# Patient Record
Sex: FEMALE | Race: WHITE
[De-identification: names, ages, dates, MRNs, and addresses within clinical notes are randomized per-mention and may not be internally consistent; named-entity substitution may affect disease eponyms.]

---

## 2017-05-17 ENCOUNTER — HOSPITAL ENCOUNTER (EMERGENCY)
Dept: HOSPITAL 84 - D.ER | Age: 57
Discharge: HOME | End: 2017-05-17
Payer: MEDICARE

## 2017-05-17 DIAGNOSIS — M25.571: Primary | ICD-10-CM

## 2017-12-13 ENCOUNTER — HOSPITAL ENCOUNTER (OUTPATIENT)
Dept: HOSPITAL 84 - D.RAD | Age: 57
Discharge: HOME | End: 2017-12-13
Attending: FAMILY MEDICINE
Payer: MEDICARE

## 2017-12-13 DIAGNOSIS — R06.00: Primary | ICD-10-CM

## 2018-06-08 ENCOUNTER — HOSPITAL ENCOUNTER (OUTPATIENT)
Dept: HOSPITAL 84 - D.MAMMO | Age: 58
Discharge: HOME | End: 2018-06-08
Attending: FAMILY MEDICINE
Payer: MEDICARE

## 2018-06-08 DIAGNOSIS — N63.14: Primary | ICD-10-CM

## 2019-02-01 ENCOUNTER — HOSPITAL ENCOUNTER (OUTPATIENT)
Dept: HOSPITAL 84 - D.RAD | Age: 59
Discharge: HOME | End: 2019-02-01
Attending: FAMILY MEDICINE
Payer: MEDICARE

## 2019-02-01 VITALS — BODY MASS INDEX: 37.7 KG/M2

## 2019-02-01 DIAGNOSIS — M25.561: Primary | ICD-10-CM

## 2019-02-14 ENCOUNTER — HOSPITAL ENCOUNTER (OUTPATIENT)
Dept: HOSPITAL 84 - D.MRI | Age: 59
Discharge: HOME | End: 2019-02-14
Attending: FAMILY MEDICINE
Payer: COMMERCIAL

## 2019-02-14 VITALS — BODY MASS INDEX: 37.7 KG/M2

## 2019-02-14 DIAGNOSIS — M23.91: Primary | ICD-10-CM

## 2019-02-27 LAB
ERYTHROCYTE [DISTWIDTH] IN BLOOD BY AUTOMATED COUNT: 13.8 % (ref 11.5–14.5)
HCT VFR BLD CALC: 42.7 % (ref 36–48)
HGB BLD-MCNC: 14.7 G/DL (ref 12–16)
MCH RBC QN AUTO: 30.4 PG (ref 26–34)
MCHC RBC AUTO-ENTMCNC: 34.4 G/DL (ref 31–37)
MCV RBC: 88.2 FL (ref 80–100)
PLATELET # BLD: 350 10X3/UL (ref 130–400)
PMV BLD AUTO: 10.1 FL (ref 7.4–10.4)
RBC # BLD AUTO: 4.84 10X6/UL (ref 4–5.4)
WBC # BLD AUTO: 8.3 10X3/UL (ref 4.8–10.8)

## 2019-02-28 ENCOUNTER — HOSPITAL ENCOUNTER (OUTPATIENT)
Dept: HOSPITAL 84 - D.OPS | Age: 59
Discharge: HOME | End: 2019-02-28
Attending: ORTHOPAEDIC SURGERY
Payer: MEDICARE

## 2019-02-28 VITALS
WEIGHT: 240 LBS | HEIGHT: 67 IN | WEIGHT: 240 LBS | HEIGHT: 67 IN | BODY MASS INDEX: 37.67 KG/M2 | BODY MASS INDEX: 37.67 KG/M2

## 2019-02-28 VITALS — DIASTOLIC BLOOD PRESSURE: 63 MMHG | SYSTOLIC BLOOD PRESSURE: 109 MMHG

## 2019-02-28 DIAGNOSIS — M94.261: ICD-10-CM

## 2019-02-28 DIAGNOSIS — Z01.812: ICD-10-CM

## 2019-02-28 DIAGNOSIS — S83.271A: Primary | ICD-10-CM

## 2019-02-28 NOTE — NUR
1435-RECD FROM PACU. ALERT. IV PATENT. R KNEE DRESSING DRY ANDINTACT.
 ICE PACK IN PLACE.
1505-NO NAUSEA AFTER FULL LIQUIDS.
1535-UP TO BATHROOM, VOIDS
1600-IV D/C AND DRESSES
1615-DISCHARGE INSTRUCTIONS REVIEWED
1620-D/C HOME VIA WHEELCHAIR WITH FAMILY.

## 2019-03-04 NOTE — OP
PATIENT NAME:  YAZAN KATHLEEN                          MEDICAL RECORD: [id number]
:60                                             LOCATION:D.OPS          
                                                         ADMISSION DATE:        
SURGEON:  AURA MAC MD        
 
 
DATE OF OPERATION:  2019
 
PREOPERATIVE DIAGNOSIS:  Lateral meniscus tear of the right knee.
 
POSTOPERATIVE DIAGNOSES:  Lateral meniscus tear of the right knee plus large
chondral defect with multiple chondral fragments on medial femoral condyle.
 
PROCEDURES:
1.  Arthroscopic partial lateral meniscectomy.
2.  Arthroscopic chondroplasty with removal of multiple chondral fragments.
 
SURGEON:  Aura Mac MD
 
ANESTHESIA:  General.
 
INTRAOPERATIVE COMPLICATIONS:  None.
 
SUMMARY OF PATHOLOGIC FINDINGS:  The patient essentially had a large completely
detached fragmented chondral flap that was essentially the entire weightbearing
surface of the medial femoral condyle.  She also had a complex tear of the
posterior horn of the lateral meniscus.
 
OPERATIVE SUMMARY IN DETAIL:  After obtaining the appropriate preoperative
orthopedic surgery consent as well as anesthetic consultation, evaluation and
clearance, the patient was brought to the operating room and placed on the
operating table in supine position.  .  After general laryngeal mask airway was
administered, tourniquet was placed on the proximal aspect of the right lower
extremity.  The extremity was prepped and draped in routine sterile fashion. 
The leg was elevated and exsanguinated, tourniquet was inflated to 350 mmHg. 
Routine inferolateral portal was established, followed by superomedial portal
and inferomedial portal.  Diagnostic arthroscopy was then performed showing the
above findings.  Arthroscopic resector was utilized to gently remove all
floating loose chondral bodies and loose chondral flaps.  Multiple photographs
were taken of the patient's medial femoral condyle.  Having completed the
takedown and chondroplasty of the medial femoral condyle, the lateral
compartment was explored.  The patient did have a complex tear of the posterior
horn of the lateral meniscus that was debrided gently and nicely.  There were no
untoward chondral fissuring in the lateral compartment or patellofemoral
compartment.  Having completed this, the knee was insufflated with 80 mg of
Depo-Medrol and 30 cc of 0.25% Marcaine plain.  Arthroscopy portals were closed
in routine interrupted fashion using 4-0 Prolene.  Sterile dressings were
applied.  Tourniquet was deflated.  The patient was awakened and taken to the
recovery room in stable condition.  All final needle and sponge counts were
correct.
 
TRANSINT:[id number] Voice Confirmation ID: [id number] DOCUMENT ID: [id number]
 
 
 
OPERATIVE REPORT                               [id number]    YAZAN KATHLEEN MD, AURA REYES        
 
 
 
Electronically Signed by AURA MAC MD on 19 at 1004
 
 
 
 
 
 
 
 
 
 
 
 
 
 
 
 
 
 
 
 
 
 
 
 
 
 
 
 
 
 
 
 
 
 
 
 
 
 
 
 
 
CC:                                                             [id number]
DICTATION DATE: 19 1352     :     19 1530      [facility] 
                                                                      [address]
[place] [id number] [place] [id number]

## 2020-04-15 ENCOUNTER — HOSPITAL ENCOUNTER (OUTPATIENT)
Dept: HOSPITAL 84 - D.MRI | Age: 60
Discharge: HOME | End: 2020-04-15
Attending: CLINICAL NURSE SPECIALIST
Payer: MEDICARE

## 2020-04-15 VITALS — BODY MASS INDEX: 37.7 KG/M2

## 2020-04-15 DIAGNOSIS — M25.561: Primary | ICD-10-CM

## 2020-06-17 ENCOUNTER — HOSPITAL ENCOUNTER (OUTPATIENT)
Dept: HOSPITAL 84 - D.MRI | Age: 60
Discharge: HOME | End: 2020-06-17
Attending: ORTHOPAEDIC SURGERY
Payer: MEDICARE

## 2020-06-17 VITALS — BODY MASS INDEX: 37.7 KG/M2

## 2020-06-17 DIAGNOSIS — M25.562: Primary | ICD-10-CM

## 2020-07-07 LAB
ANION GAP SERPL CALC-SCNC: 8.5 MMOL/L (ref 8–16)
BUN SERPL-MCNC: 21 MG/DL (ref 7–18)
CALCIUM SERPL-MCNC: 9.6 MG/DL (ref 8.5–10.1)
CHLORIDE SERPL-SCNC: 102 MMOL/L (ref 98–107)
CO2 SERPL-SCNC: 31.6 MMOL/L (ref 21–32)
CREAT SERPL-MCNC: 1.3 MG/DL (ref 0.6–1.3)
ERYTHROCYTE [DISTWIDTH] IN BLOOD BY AUTOMATED COUNT: 13.7 % (ref 11.5–14.5)
GLUCOSE SERPL-MCNC: 114 MG/DL (ref 74–106)
HCT VFR BLD CALC: 43.4 % (ref 36–48)
HGB BLD-MCNC: 13.9 G/DL (ref 12–16)
MCH RBC QN AUTO: 29 PG (ref 26–34)
MCHC RBC AUTO-ENTMCNC: 32 G/DL (ref 31–37)
MCV RBC: 90.6 FL (ref 80–100)
OSMOLALITY SERPL CALC.SUM OF ELEC: 279 MOSM/KG (ref 275–300)
PLATELET # BLD: 323 10X3/UL (ref 130–400)
PMV BLD AUTO: 9.7 FL (ref 7.4–10.4)
POTASSIUM SERPL-SCNC: 4.1 MMOL/L (ref 3.5–5.1)
RBC # BLD AUTO: 4.79 10X6/UL (ref 4–5.4)
SODIUM SERPL-SCNC: 138 MMOL/L (ref 136–145)
WBC # BLD AUTO: 9.2 10X3/UL (ref 4.8–10.8)

## 2020-07-09 ENCOUNTER — HOSPITAL ENCOUNTER (OUTPATIENT)
Dept: HOSPITAL 84 - D.OPS | Age: 60
Discharge: HOME | End: 2020-07-09
Attending: ORTHOPAEDIC SURGERY
Payer: MEDICARE

## 2020-07-09 VITALS
WEIGHT: 240 LBS | BODY MASS INDEX: 37.67 KG/M2 | WEIGHT: 240 LBS | HEIGHT: 67 IN | BODY MASS INDEX: 37.67 KG/M2 | HEIGHT: 67 IN

## 2020-07-09 VITALS — SYSTOLIC BLOOD PRESSURE: 128 MMHG | DIASTOLIC BLOOD PRESSURE: 86 MMHG

## 2020-07-09 DIAGNOSIS — J45.909: ICD-10-CM

## 2020-07-09 DIAGNOSIS — S83.282A: Primary | ICD-10-CM
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
## 2020-07-09 DIAGNOSIS — M25.512: ICD-10-CM

## 2020-07-14 NOTE — OP
PATIENT NAME:  YAZAN KATHLEEN                          MEDICAL RECORD: [id number]
:60                                             LOCATION:D.OPS          
                                                         ADMISSION DATE:        
SURGEON:  AURA MAC MD        
 
 
DATE OF OPERATION:  2020
 
PREOPERATIVE DIAGNOSES:
1.  Medial meniscus tear of the left knee.
2.  Lateral meniscus tear of the left knee.
 
POSTOPERATIVE DIAGNOSES:
1.  Medial meniscus tear of the left knee.
2.  Lateral meniscus tear of the left knee.
 
PROCEDURES:
1.  Arthroscopic partial medial meniscectomy of the left knee.
2.  Arthroscopic partial lateral meniscectomy of the left knee.
 
SURGEON:  Aura Mac MD
 
ANESTHESIA:  General.
 
INTRAOPERATIVE COMPLICATIONS:  None.
 
SUMMARY OF PATHOLOGIC FINDINGS:  The patient had a complex tear of the posterior
horn of the medial meniscus as well as a radial beak-type tear of the lateral
meniscus.
 
OPERATIVE SUMMARY IN DETAIL:  After obtaining the appropriate preoperative
orthopedic surgery consent as well as anesthetic consultation, evaluation, and
clearance, the patient was brought to the operating room and placed on the
operating table in the supine position.  After adequate general laryngeal mask
airway was administered, a tourniquet was placed about the proximal aspect of
the left lower extremity.  The left lower extremity was then prepped and draped
in a routine sterile fashion.  At this point, the appropriate time-out was taken
and agreed upon by all given the patient's unique identifiers.  The left lower
extremity was then elevated, exsanguinated and the tourniquet was inflated to
350 mmHg.  Routine inferolateral portal was established followed by superomedial
portal and inferomedial portal.  Diagnostic arthroscopy did reveal the above
findings.  A complex tear of the posterior horn of the medial meniscus was taken
down with a combination of both meniscotomes as well as an arthroscopic resector
until the patient had stable meniscal elements without any further tearing
noted.  Having completed this, the knee was placed in a figure-of-four and the
large radial beak tear was taken out of the mid substance of the lateral
meniscus with only the inner portion having to be removed.  Only mild
chondromalacia was seen in the medial compartment.  None in the lateral
patellofemoral compartment.  Having completed this, the knee was insufflated
with 0.25% Marcaine plain 30 mL and 40 mg of Depo-Medrol.  Arthroscopy portals
were closed in a routine interrupted fashion using 4-0 Prolene.  Sterile
dressings were applied.  The patient was awakened and taken to the recovery room
in stable condition.  All final needle and sponge counts were correct.
 
NTS:[id number] Voice Confirmation ID: [id number] DOCUMENT ID: [id number]
 
 
 
OPERATIVE REPORT                               [id number]    YAZAN KATHLEEN MD, AURA REYES        
 
 
 
Electronically Signed by AURA MAC MD on 20 at 0915
 
 
 
 
 
 
 
 
 
 
 
 
 
 
 
 
 
 
 
 
 
 
 
 
 
 
 
 
 
 
 
 
 
 
 
 
 
 
 
 
 
CC:                                                             [id number]
DICTATION DATE: 20     :     20 1828      [facility] 
                                                                      [address]
[place] [id number] [place] [address]

## 2020-09-15 ENCOUNTER — HOSPITAL ENCOUNTER (OUTPATIENT)
Dept: HOSPITAL 84 - D.MAMMO | Age: 60
Discharge: HOME | End: 2020-09-15
Attending: NURSE PRACTITIONER
Payer: MEDICARE

## 2020-09-15 VITALS — BODY MASS INDEX: 37.7 KG/M2

## 2020-09-15 DIAGNOSIS — Z12.31: Primary | ICD-10-CM
